# Patient Record
Sex: FEMALE
[De-identification: names, ages, dates, MRNs, and addresses within clinical notes are randomized per-mention and may not be internally consistent; named-entity substitution may affect disease eponyms.]

---

## 2021-03-10 LAB
BASOPHILS # BLD AUTO: 0 10^3/UL (ref 0–0.1)
BASOPHILS NFR BLD AUTO: 0 % (ref 0–10)
EOSINOPHIL # BLD AUTO: 0 10^3/UL (ref 0–0.3)
EOSINOPHIL NFR BLD AUTO: 1 % (ref 0–10)
HCT VFR BLD CALC: 42 % (ref 35–52)
HGB BLD-MCNC: 13.9 G/DL (ref 11.5–16)
LYMPHOCYTES # BLD AUTO: 1 10^3/UL (ref 1–4)
LYMPHOCYTES NFR BLD AUTO: 28 % (ref 12–44)
MANUAL DIFFERENTIAL PERFORMED BLD QL: NO
MCH RBC QN AUTO: 29 PG (ref 25–34)
MCHC RBC AUTO-ENTMCNC: 33 G/DL (ref 32–36)
MCV RBC AUTO: 88 FL (ref 80–99)
MONOCYTES # BLD AUTO: 0.3 10^3/UL (ref 0–1)
MONOCYTES NFR BLD AUTO: 8 % (ref 0–12)
NEUTROPHILS # BLD AUTO: 2.3 10^3/UL (ref 1.8–7.8)
NEUTROPHILS NFR BLD AUTO: 63 % (ref 42–75)
PLATELET # BLD: 248 10^3/UL (ref 130–400)
PMV BLD AUTO: 10.4 FL (ref 9–12.2)
WBC # BLD AUTO: 3.6 10^3/UL (ref 4.3–11)

## 2021-06-01 ENCOUNTER — HOSPITAL ENCOUNTER (OUTPATIENT)
Dept: HOSPITAL 75 - ONC | Age: 51
LOS: 7 days | Discharge: HOME | End: 2021-06-08
Attending: INTERNAL MEDICINE
Payer: COMMERCIAL

## 2021-06-01 DIAGNOSIS — E61.1: ICD-10-CM

## 2021-06-01 DIAGNOSIS — D72.819: Primary | ICD-10-CM

## 2021-06-01 LAB
BASOPHILS # BLD AUTO: 0 10^3/UL (ref 0–0.1)
BASOPHILS NFR BLD AUTO: 1 % (ref 0–10)
EOSINOPHIL # BLD AUTO: 0.2 10^3/UL (ref 0–0.3)
EOSINOPHIL NFR BLD AUTO: 4 % (ref 0–10)
HCT VFR BLD CALC: 43 % (ref 35–52)
HGB BLD-MCNC: 14 G/DL (ref 11.5–16)
LYMPHOCYTES # BLD AUTO: 1.6 10^3/UL (ref 1–4)
LYMPHOCYTES NFR BLD AUTO: 37 % (ref 12–44)
MANUAL DIFFERENTIAL PERFORMED BLD QL: NO
MCH RBC QN AUTO: 29 PG (ref 25–34)
MCHC RBC AUTO-ENTMCNC: 32 G/DL (ref 32–36)
MCV RBC AUTO: 90 FL (ref 80–99)
MONOCYTES # BLD AUTO: 0.4 10^3/UL (ref 0–1)
MONOCYTES NFR BLD AUTO: 10 % (ref 0–12)
NEUTROPHILS # BLD AUTO: 2.1 10^3/UL (ref 1.8–7.8)
NEUTROPHILS NFR BLD AUTO: 49 % (ref 42–75)
PLATELET # BLD: 237 10^3/UL (ref 130–400)
PMV BLD AUTO: 10.2 FL (ref 9–12.2)
WBC # BLD AUTO: 4.3 10^3/UL (ref 4.3–11)

## 2021-06-01 PROCEDURE — 86038 ANTINUCLEAR ANTIBODIES: CPT

## 2021-06-01 PROCEDURE — 86039 ANTINUCLEAR ANTIBODIES (ANA): CPT

## 2021-06-01 PROCEDURE — 82607 VITAMIN B-12: CPT

## 2021-06-01 PROCEDURE — 85025 COMPLETE CBC W/AUTO DIFF WBC: CPT

## 2021-06-01 PROCEDURE — 99213 OFFICE O/P EST LOW 20 MIN: CPT

## 2021-06-01 PROCEDURE — 82746 ASSAY OF FOLIC ACID SERUM: CPT

## 2021-06-01 PROCEDURE — 99214 OFFICE O/P EST MOD 30 MIN: CPT

## 2021-12-09 ENCOUNTER — HOSPITAL ENCOUNTER (OUTPATIENT)
Dept: HOSPITAL 75 - ONC | Age: 51
LOS: 22 days | Discharge: HOME | End: 2021-12-31
Attending: INTERNAL MEDICINE
Payer: COMMERCIAL

## 2021-12-09 DIAGNOSIS — D72.819: Primary | ICD-10-CM

## 2021-12-09 LAB
BASOPHILS # BLD AUTO: 0 10^3/UL (ref 0–0.1)
BASOPHILS NFR BLD AUTO: 0 % (ref 0–10)
EOSINOPHIL # BLD AUTO: 0.1 10^3/UL (ref 0–0.3)
EOSINOPHIL NFR BLD AUTO: 1 % (ref 0–10)
HCT VFR BLD CALC: 41 % (ref 35–52)
HGB BLD-MCNC: 13.2 G/DL (ref 11.5–16)
LYMPHOCYTES # BLD AUTO: 1.3 10^3/UL (ref 1–4)
LYMPHOCYTES NFR BLD AUTO: 35 % (ref 12–44)
MANUAL DIFFERENTIAL PERFORMED BLD QL: NO
MCH RBC QN AUTO: 29 PG (ref 25–34)
MCHC RBC AUTO-ENTMCNC: 33 G/DL (ref 32–36)
MCV RBC AUTO: 89 FL (ref 80–99)
MONOCYTES # BLD AUTO: 0.4 10^3/UL (ref 0–1)
MONOCYTES NFR BLD AUTO: 10 % (ref 0–12)
NEUTROPHILS # BLD AUTO: 2 10^3/UL (ref 1.8–7.8)
NEUTROPHILS NFR BLD AUTO: 53 % (ref 42–75)
PLATELET # BLD: 227 10^3/UL (ref 130–400)
PMV BLD AUTO: 10.3 FL (ref 9–12.2)
WBC # BLD AUTO: 3.8 10^3/UL (ref 4.3–11)

## 2021-12-09 PROCEDURE — 99213 OFFICE O/P EST LOW 20 MIN: CPT

## 2021-12-09 PROCEDURE — 85025 COMPLETE CBC W/AUTO DIFF WBC: CPT

## 2022-07-27 ENCOUNTER — HOSPITAL ENCOUNTER (OUTPATIENT)
Dept: HOSPITAL 75 - PREOP | Age: 52
LOS: 4 days | Discharge: HOME | End: 2022-07-31
Attending: SURGERY
Payer: SELF-PAY

## 2022-07-27 VITALS — HEIGHT: 62.99 IN | BODY MASS INDEX: 29.61 KG/M2 | WEIGHT: 167.11 LBS

## 2022-07-27 DIAGNOSIS — Z01.818: Primary | ICD-10-CM

## 2022-09-12 ENCOUNTER — HOSPITAL ENCOUNTER (OUTPATIENT)
Dept: HOSPITAL 75 - ENDO | Age: 52
Discharge: HOME | End: 2022-09-12
Attending: SURGERY
Payer: COMMERCIAL

## 2022-09-12 VITALS — SYSTOLIC BLOOD PRESSURE: 131 MMHG | DIASTOLIC BLOOD PRESSURE: 88 MMHG

## 2022-09-12 VITALS — SYSTOLIC BLOOD PRESSURE: 126 MMHG | DIASTOLIC BLOOD PRESSURE: 73 MMHG

## 2022-09-12 VITALS — HEIGHT: 62.99 IN | BODY MASS INDEX: 29.61 KG/M2 | WEIGHT: 167.11 LBS

## 2022-09-12 VITALS — DIASTOLIC BLOOD PRESSURE: 50 MMHG | SYSTOLIC BLOOD PRESSURE: 85 MMHG

## 2022-09-12 VITALS — DIASTOLIC BLOOD PRESSURE: 45 MMHG | SYSTOLIC BLOOD PRESSURE: 75 MMHG

## 2022-09-12 DIAGNOSIS — K29.50: ICD-10-CM

## 2022-09-12 DIAGNOSIS — K31.89: ICD-10-CM

## 2022-09-12 DIAGNOSIS — K63.5: ICD-10-CM

## 2022-09-12 DIAGNOSIS — K20.90: ICD-10-CM

## 2022-09-12 DIAGNOSIS — K31.7: ICD-10-CM

## 2022-09-12 DIAGNOSIS — K57.30: ICD-10-CM

## 2022-09-12 DIAGNOSIS — K44.9: ICD-10-CM

## 2022-09-12 DIAGNOSIS — K31.A11: ICD-10-CM

## 2022-09-12 DIAGNOSIS — Z12.11: Primary | ICD-10-CM

## 2022-09-12 DIAGNOSIS — K64.8: ICD-10-CM

## 2022-09-12 PROCEDURE — 84703 CHORIONIC GONADOTROPIN ASSAY: CPT

## 2022-09-12 PROCEDURE — 82947 ASSAY GLUCOSE BLOOD QUANT: CPT

## 2022-09-12 NOTE — ANESTHESIA-GENERAL POST-OP
MAC


Patient Condition


Mental Status/LOC:  Same as Preop


Cardiovascular:  Satisfactory


Nausea/Vomiting:  Absent


Respiratory:  Satisfactory


Pain:  Controlled


Complications:  Absent





Post Op Complications


Complications


None





Follow Up Care/Instructions


Patient Instructions


None needed.





Anesthesiology Discharge Order


Discharge Order


Patient is doing well, no complaints, stable vital signs, no apparent adverse 

anesthesia problems.   


No complications reported per nursing.











MIKE CANALES CRNA              Sep 12, 2022 14:04

## 2022-09-12 NOTE — PROGRESS NOTE-POST OPERATIVE
Post-Operative Progess Note


Surgeon (s)/Assistant (s)


Surgeon


GIACOMO LABOY DO


Assistant:  Jony Valentin, MSIII





Pre-Operative Diagnosis


Gastritis, Screening colonoscopy





Post-Operative Diagnosis





Gastritis


Hiatal Hernia


Gastric Polyp


Colon polyp


diverticula


int hemorrhoids





Procedure & Operative Findings


Date of Procedure


9/12/22


Procedure Performed/Findings


EGD with bx


EGD with hot bx removal of polyp


Colonoscopy with hot bx





PROCEDURE NOTE:


After informed consent was obtained, the patient was brought to the endoscopy


suite, placed in bed in left lateral decubitus position.  She was administered


IV sedation by the CRNA who then monitored  vitals the entire time, heart


rate, blood pressure and pulse ox and the scope was inserted down the mouth


through the esophagus into the stomach.  On the way down, noted some mild


esophagitis, took a picture, pushed into the stomach, pushed past the antrum


into the duodenum.  Duodenum looked good.  Pulled back and did a biopsy of


antrum, then retroflexed the scope, saw a small hiatal hernia and took a picture




of this.  I also found a few polyps and elected to remove two of them with hot 


biopsy. Next, pulled the scope into the GE junction, took another picture of the


hiatal hernia and then did a biopsy of the GE junction.  Pushed the scope back


into the stomach, suctioned all the air out of the stomach. At this point pulled

the 


scope up the esophagus and out the mouth.  Switched camera, switched gloves, 


went down below and started the colonoscopy.  Pushed all the way into about 


140 cm to get all the way to cecum, took a picture of the appendiceal orifice, 


noted the ileocecal valve and able to get into terminal ileum and took a 

picture.


Then slowly withdrew the scope, insufflating to look circumferentially at the 

walls


starting in the cecum, up the ascending colon to the hepatic flexure, then down 

the


transverse colon, splenic flexure, into the descending colon and down into the


sigmoid.  Found a very small polyp here and did a hot biopsy of this.  I also 

had


noted some diverticula on the right and left side, took a picture of them.  

Finally 


into the rectum, retroflexed in the rectal vault, saw some minimal internal 

hemorrhoids,


I did not see any fistula and took a picture of the hemorrhoids.





The patient tolerated the procedure and she recovered in the endoscopy suite.





Recommended for repeat colonoscopy in 5 years if polyp is adenoma, 10 years if 

it


is hyperplastic.


Anesthesia Type


IV sedation by CRNA





Estimated Blood Loss


Estimated blood loss (mL):  scant





Specimens/Packing


Specimens Removed


Antral bx


Gastric polyp x 2, 3 pieces


GE jxn bx


Sigmoid polyp











GIACOMO LABOY DO               Sep 12, 2022 09:18

## 2022-09-12 NOTE — ENDOSCOPY DISCHARGE INSTRUCT
Endo Procedure/Findings


Findings


1.:  Hiatal Hernia, Gastritis


2.:  Polyp


3.:  Diverticulosis


4.:  Internal Hemorrhoids





Discharge Instructions


-


Activity: You might feel a little sleepy until tomorrow.  This is due to the 

medicine you received to relax you.





Until tomorrow, you should:  


   NOT drive a car, operate machinery or power tools.


   NOT drink any alcoholic beverages.


   NOT make any important decisions or sign importortant papers.





Do not return to work until tomorrow, unless otherwise instructed. Resume 

previous activities tomorrow.





Diet: Start by taking liquids.  If you tolerate liquids, advance to solid food.


1.:  EGD in 3 years


2.:  Colonscopy in 5 years





Notify Physician


-


If you experience excessive bleeding, unusual abdominal pain, fever, or chest 

pain, contact your doctor immediately.











GIACOMO LABOY DO               Sep 12, 2022 09:20